# Patient Record
Sex: MALE | Race: WHITE
[De-identification: names, ages, dates, MRNs, and addresses within clinical notes are randomized per-mention and may not be internally consistent; named-entity substitution may affect disease eponyms.]

---

## 2019-08-10 ENCOUNTER — HOSPITAL ENCOUNTER (EMERGENCY)
Dept: HOSPITAL 89 - ER | Age: 34
Discharge: HOME | End: 2019-08-10
Payer: SELF-PAY

## 2019-08-10 VITALS — SYSTOLIC BLOOD PRESSURE: 143 MMHG | DIASTOLIC BLOOD PRESSURE: 92 MMHG

## 2019-08-10 DIAGNOSIS — S71.111A: Primary | ICD-10-CM

## 2019-08-10 DIAGNOSIS — L03.115: ICD-10-CM

## 2019-08-10 PROCEDURE — 99283 EMERGENCY DEPT VISIT LOW MDM: CPT

## 2019-08-10 PROCEDURE — 90715 TDAP VACCINE 7 YRS/> IM: CPT

## 2019-08-10 PROCEDURE — 90471 IMMUNIZATION ADMIN: CPT

## 2019-08-10 NOTE — ER REPORT
History and Physical


Time Seen By MD:  21:21


HPI/ROS


CHIEF COMPLAINT: Leg laceration





HISTORY OF PRESENT ILLNESS: 34 year-old male presents ambulatory to the ER with 


concerns around the wound on his right lateral thigh just above the knee.  He 


was taking out the trash last night at work when something sharp in the trash 


bag poked his leg and caused a tiny laceration, 1.5 cm on the lateral aspect of 


his leg.  He did not seek treatment.  He did not clean the wound.  It appears 


infected.  There is a large area of cellulitis extending her on the back of his 


leg.  Patient notes no fever or chills.  Patient thinks his last tetanus shot is


greater than 10 years.  He'll be administered a booster.


Allergies:  


Coded Allergies:  


     No Known Drug Allergies (Unverified , 8/10/19)


Home Meds


Active Scripts


Cephalexin Monohydrate (CEPHALEXIN) 500 Mg Cap, 500 MG PO TID for infection, #20


CAP


   TAKE 1 CAPSULE BY MOUTH EVERY SIX HOURS


   Prov:TAINA PATTERSON DO         8/10/19


Reported Medications


Albuterol Sulfate 90 Mcg/Act (PROAIR HFA 90 MCG/ACT) 8.5 Gm Hfa.aer.ad, 1-2 PUFF


IH 3-4XD, INHALER


   8/10/19


Fluticasone/Salmeterol (ADVAIR 100-50 DISKUS) 1 Each Disk.w.dev, 1 EACH IH


   8/10/19


Bupropion Hcl (WELLBUTRIN SR) 200 Mg Tablet.er, 200 MG PO QDAY, TAB


   8/10/19


Reviewed Nurses Notes:  Yes


Old Medical Records Reviewed:  Yes


Constitutional





Vital Sign - Last 24 Hours








 8/10/19





 21:26


 


Temp 98.9


 


Pulse 108


 


Resp 20


 


B/P (MAP) 143/92


 


Pulse Ox 90


 


O2 Delivery Room Air








Physical Exam


   General appearance: Alert no distress.


Respiratory: Chest is non tender, lungs are clear to auscultation.


Cardiac: Regular rate and rhythm 


Extremities:, Examination of the right lower trauma.  Reveals a neurovascularly 


intact leg.  There is a large area of erythema extending posteriorly from the 


wound.  There is purulent drainage coming from the wound.





DIFFERENTIAL DIAGNOSIS: After history and physical exam differential diagnosis 


was considered for cellulitis, open wound, possible foreign body





Medical Decision Making


ED Course/Re-evaluation


ED Course


Patient was admitted to an examination room.  H&P was done.  The differential 


diagnoses was considered.  On conical examination.  Patient has cellulitis 


associated with an open wound from 24 hours ago.  He'll be treated with Keflex 


500 mg 3 times a day.  His wounds cleaned and dressed.  His tetanus status is 


updated with a booster.  Patient advised to follow-up in 48 hours.  If his wound


is unimproved.  And his cellulitis is not resolving.


Decision to Disposition Date:  Aug 10, 2019


Decision to Disposition Time:  21:26





Depart


Departure


Latest Vital Signs





Vital Signs








  Date Time  Temp Pulse Resp B/P (MAP) Pulse Ox O2 Delivery O2 Flow Rate FiO2


 


8/10/19 21:26 98.9 108 20 143/92 90 Room Air  








Impression:  


   Primary Impression:  


   Laceration of right thigh


   Additional Impression:  


   Cellulitis and abscess of leg


Condition:  Improved


Disposition:  HOME OR SELF-CARE


New Scripts


Cephalexin Monohydrate (CEPHALEXIN) 500 Mg Cap


500 MG PO TID for infection, #20 CAP


   TAKE 1 CAPSULE BY MOUTH EVERY SIX HOURS


   Prov: TAINA PATTERSON DO         8/10/19


Patient Instructions:  Cellulitis (ED), Laceration Without Closure (ED)





Additional Instructions:  


Take ibuprofen 200 mg 3 tablets 3 times a day as needed for pain relief


Take your antibiotic until gone


Follow-up in 2-3 days if not significantly improved





Problem Qualifiers








   Primary Impression:  


   Laceration of right thigh


   Encounter type:  initial encounter  Qualified Codes:  S71.111A - Laceration 


   without foreign body, right thigh, initial encounter








TAINA PATTERSON DO              Aug 10, 2019 21:25